# Patient Record
Sex: MALE | Race: WHITE | NOT HISPANIC OR LATINO | Employment: FULL TIME | URBAN - METROPOLITAN AREA
[De-identification: names, ages, dates, MRNs, and addresses within clinical notes are randomized per-mention and may not be internally consistent; named-entity substitution may affect disease eponyms.]

---

## 2019-09-27 ENCOUNTER — TELEPHONE (OUTPATIENT)
Dept: SCHEDULING | Facility: CLINIC | Age: 74
End: 2019-09-27

## 2019-09-27 NOTE — TELEPHONE ENCOUNTER
"Patient needs APPT with Olivia or Arden, not with Dr Velez     Spoke with patient's wife - informed her that Kayce Baker and Arden are the specialists for Aortic Valve   Patient worked up at [facility], needs Aortic Valve - looking for MIS approach.  Cath 2 weeks ago, \"coronaries are clean\".      Please contact patient on Tuesday, (busy with liza Santos)   Best contact # -  Charly Reaves CELL # [phone]    Thanks DENZEL rainey   "

## 2019-09-27 NOTE — TELEPHONE ENCOUNTER
Nicki, spouse requesting Np appt for  Charly with Dr. Velez referred by current cardiologist Dr. Beka Licona at [facility] for Arterial Valve robotic replacement.    Prior hx: Had cardiac cath two weeks ago at [facility] tel: [phone] ( wife states all cardiac hx at [facility])      Nicki did not have insurance cards at time of call, but states when she gets call back she will give information. Pt has both medicare and Blue cross blue shield insurance.    Nicki can be reached at [phone]

## 2019-09-30 NOTE — TELEPHONE ENCOUNTER
Noted. I will call patient tomorrow 10/1 as requesed.    MAW- Do you by chance know if this patient has had an Echo already or not?

## 2019-10-02 ENCOUNTER — TELEPHONE (OUTPATIENT)
Dept: CARDIOTHORACIC SURGERY | Facility: CLINIC | Age: 74
End: 2019-10-02

## 2019-10-02 NOTE — TELEPHONE ENCOUNTER
Signed release required for medical records at [facility] [address]. Email SHONA to sign and return.    Patient has already requested cath and echo disc from [facility] sent via mail to us  [email or web address]    [phone] Medical Records  [phone] Fax  [phone] SHONA Althea

## 2019-10-03 PROBLEM — I35.1 AORTIC REGURGITATION: Status: ACTIVE | Noted: 2019-10-03

## 2019-10-03 PROBLEM — I45.10 RBBB (RIGHT BUNDLE BRANCH BLOCK): Status: ACTIVE | Noted: 2019-10-03

## 2019-10-03 NOTE — TELEPHONE ENCOUNTER
The cardiologist mentioned an echo that was done at [place] during a second opinion for his AI.  I believe the note said it was done Sept 18 2018. And also an echo that was done in July 2019

## 2019-10-15 ENCOUNTER — OFFICE VISIT (OUTPATIENT)
Dept: CARDIOTHORACIC SURGERY | Facility: CLINIC | Age: 74
End: 2019-10-15
Payer: MEDICARE

## 2019-10-15 VITALS
HEART RATE: 91 BPM | SYSTOLIC BLOOD PRESSURE: 138 MMHG | DIASTOLIC BLOOD PRESSURE: 62 MMHG | WEIGHT: 179.2 LBS | TEMPERATURE: 97.7 F | RESPIRATION RATE: 15 BRPM | OXYGEN SATURATION: 95 % | HEIGHT: 71 IN | BODY MASS INDEX: 25.09 KG/M2

## 2019-10-15 DIAGNOSIS — I35.1 AORTIC VALVE INSUFFICIENCY, ETIOLOGY OF CARDIAC VALVE DISEASE UNSPECIFIED: Primary | ICD-10-CM

## 2019-10-15 PROCEDURE — 99214 OFFICE O/P EST MOD 30 MIN: CPT | Performed by: THORACIC SURGERY (CARDIOTHORACIC VASCULAR SURGERY)

## 2019-10-15 RX ORDER — BENAZEPRIL HYDROCHLORIDE 40 MG/1
40 TABLET ORAL DAILY
COMMUNITY
Start: 2019-10-10

## 2019-10-15 RX ORDER — ZOLPIDEM TARTRATE 10 MG/1
10 TABLET ORAL NIGHTLY PRN
COMMUNITY
Start: 2019-08-16

## 2019-10-15 RX ORDER — ASPIRIN 81 MG/1
81 TABLET ORAL DAILY
COMMUNITY

## 2019-10-15 RX ORDER — OMEPRAZOLE 20 MG/1
20 CAPSULE, DELAYED RELEASE ORAL DAILY
COMMUNITY
Start: 2019-10-07

## 2019-10-15 RX ORDER — METOPROLOL SUCCINATE 50 MG/1
50 TABLET, EXTENDED RELEASE ORAL DAILY
COMMUNITY
Start: 2019-09-13

## 2019-10-15 RX ORDER — SIMVASTATIN 20 MG/1
20 TABLET, FILM COATED ORAL DAILY
COMMUNITY
Start: 2017-04-24

## 2019-10-15 ASSESSMENT — ENCOUNTER SYMPTOMS
ARTHRALGIAS: 0
FEVER: 0
SHORTNESS OF BREATH: 0
DIZZINESS: 0
CHEST TIGHTNESS: 0
AGITATION: 0
FATIGUE: 0
LIGHT-HEADEDNESS: 0
CONFUSION: 0

## 2019-10-15 NOTE — LETTER
October 16, 2019     Dariel Licona MD  [address] [facility]  [address] [place] [id number]    Patient: Charly Nazario  YOB: 1945  Date of Visit: 10/15/2019      Dear Dr. Licona:    Thank you for referring Charly Nazario to me for evaluation. Below are my notes for this consultation.    If you have questions, please do not hesitate to call me. I look forward to following your patient along with you.         Sincerely,        Rachid Baker MD        CC: DO Olivia Parada Scott M, MD  10/16/2019  9:45 AM  Signed  I, Rachid Baker MD, personally performed the services described in this documentation as scribed by JARRET Wall in my presence, and it is both accurate and complete.    10/16/2019 9:45 AM      Rachid Baker MD  10/16/2019  9:45 AM  Signed    Cardiac Surgery    Reason for consultation: Aortic insufficiency    HPI  Patient is a 74 y.o. male here for a surgical evaluation of his aortic insufficiency.  He has been followed by Dr. Licona for years for his aortic insufficiency.  He states this has been moderate for the past 12 years.  He has seen Dr. Tineo for a surgical opinion and is here for a second opinion.  He currently states he is completely asymptomatic.   He is very active.  He plays golf multiple times a week as well as works out.  He denies chest pain, MATHEW, SOB, dizziness, fatigue.        Medical History:   Past Medical History:   Diagnosis Date   • Aortic regurgitation 10/3/2019   • RBBB (right bundle branch block) 10/3/2019       Surgical History: History reviewed. No pertinent surgical history.    Allergies: Ibuprofen and Naproxen sodium    Current Outpatient Medications   Medication Sig Dispense Refill   • aspirin 81 mg enteric coated tablet Take 81 mg by mouth 2 times daily.     • benazepril (LOTENSIN) 40 mg tablet Take 40 mg by mouth daily.     • melatonin (MELATIN ORAL) Take 30 mg by mouth nightly as needed.     • metoprolol succinate XL (TOPROL-XL)  50 mg 24 hr tablet Take 50 mg by mouth daily.     • omeprazole (PriLOSEC) 20 mg capsule Take 20 mg by mouth daily.     • simvastatin (ZOCOR) 20 mg tablet Take 20 mg by mouth daily.     • zolpidem (AMBIEN) 10 mg tablet Take 10 mg by mouth nightly as needed.       No current facility-administered medications for this visit.        Social History:   Social History     Socioeconomic History   • Marital status:      Spouse name: None   • Number of children: None   • Years of education: None   • Highest education level: None   Occupational History   • None   Social Needs   • Financial resource strain: None   • Food insecurity:     Worry: None     Inability: None   • Transportation needs:     Medical: None     Non-medical: None   Tobacco Use   • Smoking status: Former Smoker     Last attempt to quit: 1980     Years since quittin.8   • Smokeless tobacco: Never Used   Substance and Sexual Activity   • Alcohol use: Yes     Comment: daily   • Drug use: None   • Sexual activity: None   Lifestyle   • Physical activity:     Days per week: None     Minutes per session: None   • Stress: None   Relationships   • Social connections:     Talks on phone: None     Gets together: None     Attends [employer] service: None     Active member of club or organization: None     Attends meetings of clubs or organizations: None     Relationship status: None   • Intimate partner violence:     Fear of current or ex partner: None     Emotionally abused: None     Physically abused: None     Forced sexual activity: None   Other Topics Concern   • None   Social History Narrative   • None       Family History:   Family History   Problem Relation Age of Onset   • Heart failure Biological Mother    • Cancer Biological Father        Review of Systems  Review of Systems   Constitutional: Negative for fatigue and fever.   Respiratory: Negative for chest tightness and shortness of breath.         No orthopnea   Cardiovascular: Negative for chest  "pain and leg swelling.   Genitourinary:        Rare orthopnea   Musculoskeletal: Negative for arthralgias.   Neurological: Negative for dizziness and light-headedness.   Psychiatric/Behavioral: Negative for agitation, behavioral problems and confusion.   All other systems reviewed and are negative.      Objective     Vital Signs for the last 24 hours:  Visit Vitals  /62 (BP Location: Left upper arm, Patient Position: Sitting)   Pulse 91   Temp 36.5 °C (97.7 °F)   Resp 15   Ht 1.803 m (5' 11\")   Wt 81.3 kg (179 lb 3.2 oz)   SpO2 95%   BMI 24.99 kg/m²       Physical Exam   Constitutional: He is oriented to person, place, and time. He appears well-developed and well-nourished.   HENT:   Head: Normocephalic and atraumatic.   Eyes: Pupils are equal, round, and reactive to light. EOM are normal.   Neck: Normal range of motion. Neck supple.   Cardiovascular: Regular rhythm and S1 normal.   Murmur heard.   Systolic murmur is present with a grade of 2/6.   Diastolic murmur is present with a grade of 2/6.  Pulmonary/Chest: Effort normal and breath sounds normal.   Abdominal: Soft. Bowel sounds are normal.   Musculoskeletal: Normal range of motion. He exhibits no edema.   Neurological: He is alert and oriented to person, place, and time.   Skin: Skin is warm and dry. Capillary refill takes 2 to 3 seconds.   Psychiatric: He has a normal mood and affect. His behavior is normal. Judgment and thought content normal.   Vitals reviewed.          Assessment/Plan   Assessment: Aortic insufficiency  Echoes  And cath reviewed by Dr. Bakre.  Severe asymptomatic aortic insufficiency. HR 90 today.  P 1/2T 250 with a dilated LV.  Ascending aorta measuring 5cm on echo. EF55%.  No recent hospitalization for CHF.   [facility] I.   Patient not managed with daily diuretics.  Patient denies chest pain and syncope. They are on chronic medical therapy. No CAD. Noted.  Plan:  STS risk for surgical intervention is < 1%.  Patient is not frail.  " Recommend a minimally invasive AVR with an ascending aorta replacement.  He will need a CTA chest abdomen and pelvis to further assess ascending aorta. Looks large for his size on echo.  He will also need carotid ultrasound, dental clearance and PAT's prior to surgery.  Patient verbalized understanding and is agreeable to proceed.       Right bundle branch block  Pre existing documented condition.   Plan:  May be more prone to a pacemaker post operatively if a left bundle occurs.  This was discussed with the patient and his wife.        I, JARRET Wall am scribing for and in the presence of JARRET Miller

## 2019-10-15 NOTE — PROGRESS NOTES
Cardiac Surgery    Reason for consultation: Aortic insufficiency    HPI  Patient is a 74 y.o. male here for a surgical evaluation of his aortic insufficiency.  He has been followed by Dr. Licona for years for his aortic insufficiency.  He states this has been moderate for the past 12 years.  He has seen Dr. Tineo for a surgical opinion and is here for a second opinion.  He currently states he is completely asymptomatic.   He is very active.  He plays golf multiple times a week as well as works out.  He denies chest pain, MATHEW, SOB, dizziness, fatigue.        Medical History:   Past Medical History:   Diagnosis Date   • Aortic regurgitation 10/3/2019   • RBBB (right bundle branch block) 10/3/2019       Surgical History: History reviewed. No pertinent surgical history.    Allergies: Ibuprofen and Naproxen sodium    Current Outpatient Medications   Medication Sig Dispense Refill   • aspirin 81 mg enteric coated tablet Take 81 mg by mouth 2 times daily.     • benazepril (LOTENSIN) 40 mg tablet Take 40 mg by mouth daily.     • melatonin (MELATIN ORAL) Take 30 mg by mouth nightly as needed.     • metoprolol succinate XL (TOPROL-XL) 50 mg 24 hr tablet Take 50 mg by mouth daily.     • omeprazole (PriLOSEC) 20 mg capsule Take 20 mg by mouth daily.     • simvastatin (ZOCOR) 20 mg tablet Take 20 mg by mouth daily.     • zolpidem (AMBIEN) 10 mg tablet Take 10 mg by mouth nightly as needed.       No current facility-administered medications for this visit.        Social History:   Social History     Socioeconomic History   • Marital status:      Spouse name: None   • Number of children: None   • Years of education: None   • Highest education level: None   Occupational History   • None   Social Needs   • Financial resource strain: None   • Food insecurity:     Worry: None     Inability: None   • Transportation needs:     Medical: None     Non-medical: None   Tobacco Use   • Smoking status: Former Smoker     Last attempt to  "quit: 1980     Years since quittin.8   • Smokeless tobacco: Never Used   Substance and Sexual Activity   • Alcohol use: Yes     Comment: daily   • Drug use: None   • Sexual activity: None   Lifestyle   • Physical activity:     Days per week: None     Minutes per session: None   • Stress: None   Relationships   • Social connections:     Talks on phone: None     Gets together: None     Attends [employer] service: None     Active member of club or organization: None     Attends meetings of clubs or organizations: None     Relationship status: None   • Intimate partner violence:     Fear of current or ex partner: None     Emotionally abused: None     Physically abused: None     Forced sexual activity: None   Other Topics Concern   • None   Social History Narrative   • None       Family History:   Family History   Problem Relation Age of Onset   • Heart failure Biological Mother    • Cancer Biological Father        Review of Systems  Review of Systems   Constitutional: Negative for fatigue and fever.   Respiratory: Negative for chest tightness and shortness of breath.         No orthopnea   Cardiovascular: Negative for chest pain and leg swelling.   Genitourinary:        Rare orthopnea   Musculoskeletal: Negative for arthralgias.   Neurological: Negative for dizziness and light-headedness.   Psychiatric/Behavioral: Negative for agitation, behavioral problems and confusion.   All other systems reviewed and are negative.      Objective     Vital Signs for the last 24 hours:  Visit Vitals  /62 (BP Location: Left upper arm, Patient Position: Sitting)   Pulse 91   Temp 36.5 °C (97.7 °F)   Resp 15   Ht 1.803 m (5' 11\")   Wt 81.3 kg (179 lb 3.2 oz)   SpO2 95%   BMI 24.99 kg/m²       Physical Exam   Constitutional: He is oriented to person, place, and time. He appears well-developed and well-nourished.   HENT:   Head: Normocephalic and atraumatic.   Eyes: Pupils are equal, round, and reactive to light. EOM are normal. "   Neck: Normal range of motion. Neck supple.   Cardiovascular: Regular rhythm and S1 normal.   Murmur heard.   Systolic murmur is present with a grade of 2/6.   Diastolic murmur is present with a grade of 2/6.  Pulmonary/Chest: Effort normal and breath sounds normal.   Abdominal: Soft. Bowel sounds are normal.   Musculoskeletal: Normal range of motion. He exhibits no edema.   Neurological: He is alert and oriented to person, place, and time.   Skin: Skin is warm and dry. Capillary refill takes 2 to 3 seconds.   Psychiatric: He has a normal mood and affect. His behavior is normal. Judgment and thought content normal.   Vitals reviewed.          Assessment/Plan   Assessment: Aortic insufficiency  Echoes  And cath reviewed by Dr. Baker.  Severe asymptomatic aortic insufficiency. HR 90 today.  P 1/2T 250 with a dilated LV.  Ascending aorta measuring 5cm on echo. EF55%.  No recent hospitalization for CHF.   [facility] I.   Patient not managed with daily diuretics.  Patient denies chest pain and syncope. They are on chronic medical therapy. No CAD. Noted.  Plan:  STS risk for surgical intervention is < 1%.  Patient is not frail.  Recommend a minimally invasive AVR with an ascending aorta replacement.  He will need a CTA chest abdomen and pelvis to further assess ascending aorta. Looks large for his size on echo.  He will also need carotid ultrasound, dental clearance and PAT's prior to surgery.  Patient verbalized understanding and is agreeable to proceed.       Right bundle branch block  Pre existing documented condition.   Plan:  May be more prone to a pacemaker post operatively if a left bundle occurs.  This was discussed with the patient and his wife.        I, JARRET Wall am scribing for and in the presence of JARRET Miller

## 2019-10-16 NOTE — PROGRESS NOTES
I, Rachid Baker MD, personally performed the services described in this documentation as scribed by JARRET Wall in my presence, and it is both accurate and complete.    10/16/2019 9:45 AM

## 2019-12-19 ENCOUNTER — TELEPHONE (OUTPATIENT)
Dept: SCHEDULING | Facility: CLINIC | Age: 74
End: 2019-12-19

## 2019-12-19 NOTE — TELEPHONE ENCOUNTER
Spoke with patient. He has multiple questions in regards to surgical options. He has completed his CTA at [facility]. He would like to have on office visit with Dr Baker to discuss. He will bring his CTA on CD with him. We have scheduled an appointment for January 21. He has a surgery date of January 27.

## 2019-12-19 NOTE — TELEPHONE ENCOUNTER
Pt calling speak with her about a possible surgery as discussed in appointment, no further information please call pt back at [phone] to discuss

## 2020-01-02 NOTE — TELEPHONE ENCOUNTER
Spoke to patient just to clarify that the 1/27 OR date is unable to be kept as Dr. Baker will be out of town.  He was fine with this information and will discuss a new surgery date at his appt. On 1/21/20.

## 2020-01-14 ENCOUNTER — TELEPHONE (OUTPATIENT)
Dept: CARDIOTHORACIC SURGERY | Facility: CLINIC | Age: 75
End: 2020-01-14

## 2020-01-14 PROBLEM — I77.810 ASCENDING AORTA DILATATION (CMS/HCC): Status: ACTIVE | Noted: 2020-01-14

## 2020-01-14 PROBLEM — I10 HTN (HYPERTENSION): Status: ACTIVE | Noted: 2020-01-14

## 2020-01-21 ENCOUNTER — OFFICE VISIT (OUTPATIENT)
Dept: CARDIOTHORACIC SURGERY | Facility: CLINIC | Age: 75
End: 2020-01-21
Payer: MEDICARE

## 2020-01-21 VITALS
BODY MASS INDEX: 25.76 KG/M2 | HEIGHT: 71 IN | HEART RATE: 66 BPM | OXYGEN SATURATION: 97 % | TEMPERATURE: 97.9 F | SYSTOLIC BLOOD PRESSURE: 142 MMHG | WEIGHT: 184 LBS | RESPIRATION RATE: 16 BRPM | DIASTOLIC BLOOD PRESSURE: 62 MMHG

## 2020-01-21 DIAGNOSIS — I77.810 ASCENDING AORTA DILATATION (CMS/HCC): ICD-10-CM

## 2020-01-21 DIAGNOSIS — I35.1 AORTIC VALVE INSUFFICIENCY, ETIOLOGY OF CARDIAC VALVE DISEASE UNSPECIFIED: Primary | ICD-10-CM

## 2020-01-21 PROCEDURE — 99214 OFFICE O/P EST MOD 30 MIN: CPT | Performed by: THORACIC SURGERY (CARDIOTHORACIC VASCULAR SURGERY)

## 2020-01-21 RX ORDER — MUPIROCIN 20 MG/G
OINTMENT TOPICAL
Qty: 22 G | Refills: 0 | Status: SHIPPED | OUTPATIENT
Start: 2020-01-21

## 2020-01-21 ASSESSMENT — ENCOUNTER SYMPTOMS
ARTHRALGIAS: 0
WEAKNESS: 0
AGITATION: 0
DIARRHEA: 0
CONSTIPATION: 0
ACTIVITY CHANGE: 0
MYALGIAS: 0
DIZZINESS: 0
SHORTNESS OF BREATH: 0
RHINORRHEA: 0
CHEST TIGHTNESS: 0
COUGH: 0
CONFUSION: 0
BACK PAIN: 0
WOUND: 0
BRUISES/BLEEDS EASILY: 0
LIGHT-HEADEDNESS: 0
PALPITATIONS: 0
FATIGUE: 0
DIFFICULTY URINATING: 0
FREQUENCY: 0
EYE PAIN: 0

## 2020-01-21 NOTE — PROGRESS NOTES
Cardiac Surgery    Reason for consultation: Aortic Insufficiency    HPI  Patient is a 74 y.o. male who returns for further evaluation of aortic insufficiency. He states this has been moderate for the past 12 years.  He has seen Dr. Tineo for a surgical opinion and scheduled to see Dr Stewart for another surgical opinion. He currently states he is completely asymptomatic.   He continues to be very active.  He plays golf multiple times a week as well as works out.  He denies chest pain, MATHEW, SOB, dizziness, fatigue.    Medical History:   Past Medical History:   Diagnosis Date   • Aortic regurgitation 10/3/2019   • Ascending aorta dilatation (CMS/HCC) 1/14/2020   • HTN (hypertension) 1/14/2020   • RBBB (right bundle branch block) 10/3/2019       Surgical History: History reviewed. No pertinent surgical history.    Allergies: Ibuprofen and Naproxen sodium    Current Outpatient Medications   Medication Sig Dispense Refill   • aspirin 81 mg enteric coated tablet Take 81 mg by mouth daily.       • benazepril (LOTENSIN) 40 mg tablet Take 40 mg by mouth daily.     • melatonin (MELATIN ORAL) Take 30 mg by mouth nightly as needed.     • metoprolol succinate XL (TOPROL-XL) 50 mg 24 hr tablet Take 50 mg by mouth daily.     • omeprazole (PriLOSEC) 20 mg capsule Take 20 mg by mouth daily.     • simvastatin (ZOCOR) 20 mg tablet Take 20 mg by mouth daily.     • zolpidem (AMBIEN) 10 mg tablet Take 10 mg by mouth nightly as needed.       No current facility-administered medications for this visit.        Social History:   Social History     Socioeconomic History   • Marital status:      Spouse name: None   • Number of children: None   • Years of education: None   • Highest education level: None   Occupational History   • None   Social Needs   • Financial resource strain: None   • Food insecurity:     Worry: None     Inability: None   • Transportation needs:     Medical: None     Non-medical: None   Tobacco Use   • Smoking  status: Former Smoker     Last attempt to quit: 1980     Years since quittin.0   • Smokeless tobacco: Never Used   Substance and Sexual Activity   • Alcohol use: Yes     Comment: daily   • Drug use: None   • Sexual activity: None   Lifestyle   • Physical activity:     Days per week: None     Minutes per session: None   • Stress: None   Relationships   • Social connections:     Talks on phone: None     Gets together: None     Attends [employer] service: None     Active member of club or organization: None     Attends meetings of clubs or organizations: None     Relationship status: None   • Intimate partner violence:     Fear of current or ex partner: None     Emotionally abused: None     Physically abused: None     Forced sexual activity: None   Other Topics Concern   • None   Social History Narrative   • None       Family History:   Family History   Problem Relation Age of Onset   • Heart failure Biological Mother    • Cancer Biological Father      Review of Systems   Constitutional: Negative for activity change and fatigue.   HENT: Negative for congestion, postnasal drip and rhinorrhea.    Eyes: Negative for pain and visual disturbance.   Respiratory: Negative for cough, chest tightness and shortness of breath.    Cardiovascular: Negative for chest pain, palpitations and leg swelling.   Gastrointestinal: Negative for constipation and diarrhea.   Genitourinary: Negative for difficulty urinating, frequency and urgency.   Musculoskeletal: Negative for arthralgias, back pain and myalgias.   Skin: Negative for rash and wound.   Neurological: Negative for dizziness, syncope, weakness and light-headedness.   Hematological: Does not bruise/bleed easily.   Psychiatric/Behavioral: Negative for agitation, behavioral problems and confusion.   All other systems reviewed and are negative.    Objective   Vital Signs for the last 24 hours:  Visit Vitals  BP (!) 142/62 (BP Location: Left upper arm, Patient Position: Sitting)  "  Pulse 66   Temp 36.6 °C (97.9 °F) (Oral)   Resp 16   Ht 1.803 m (5' 11\")   Wt 83.5 kg (184 lb)   SpO2 97%   BMI 25.66 kg/m²       Physical Exam   Constitutional: He is oriented to person, place, and time. He appears well-developed and well-nourished. No distress.   Cardiovascular: Normal rate.   Murmur heard.   Systolic murmur is present with a grade of 3/6.  Pulmonary/Chest: Effort normal and breath sounds normal. No respiratory distress. He has no rales.   Abdominal: Soft. Bowel sounds are normal.   Musculoskeletal: Normal range of motion. He exhibits no edema.   Neurological: He is alert and oriented to person, place, and time.   Skin: Skin is warm and dry. Capillary refill takes 2 to 3 seconds.   Psychiatric: He has a normal mood and affect. His behavior is normal. Judgment and thought content normal.   Vitals reviewed.    Assessment/Plan   Assessment: Aortic stenosis  Echo, cath and CTA reviewed by***.  Severe calcific aortic stenosis symptomatic with occasional lightheadedness, MATHEW.  JUDE***, mean gradient***, velocity***, EF***.  Symptoms not managed with daily diuretics.  [facility] II. No chest discomfort. No syncopal or presyncopal episodes.  The patient is tolerating medical therapy.  Nonobstructive CAD.  Patent femoral iliac vessels.  Plan:  STS risk for surgical intervention is***.  Patient is frail and failed frailty exam.  Recommend proceeding with a***TAVR.  Will need to stop *** prior to surgery.  Also needs carotid ultrasound and PATs prior to TAVR.  I have discussed with the patient and family the rationale for procedures as well as possible alternatives.  We discussed potential risks and complications associated with this procedure. The risks include, but are not limited to: bleeding, infection, arrhythmias, myocardial infarction, temi-valvular insufficiency,  stroke, death, renal as well as pulmonary insufficiency and the possibility of blood transfusions,  permanent pacemaker placement intra-aortic " balloon placement,  assist device placement, cardiac wire perforation, and requirement of CPB.  The patient and family understand and agree to proceed.    I, JARRET Wall am scribing for and in the presence of ***      JARRET Bazan

## 2020-01-22 PROBLEM — I35.1 AORTIC VALVE REGURGITATION: Status: ACTIVE | Noted: 2020-01-22

## 2020-01-23 ASSESSMENT — ENCOUNTER SYMPTOMS
RHINORRHEA: 0
LIGHT-HEADEDNESS: 0
DIARRHEA: 0
ACTIVITY CHANGE: 0
CONFUSION: 0
WEAKNESS: 0
SHORTNESS OF BREATH: 0
FATIGUE: 0
FREQUENCY: 0
CONSTIPATION: 0
COUGH: 0
BRUISES/BLEEDS EASILY: 0
WOUND: 0
PALPITATIONS: 0
BACK PAIN: 0
AGITATION: 0
DIZZINESS: 0
CHEST TIGHTNESS: 0
MYALGIAS: 0
DIFFICULTY URINATING: 0
ARTHRALGIAS: 0

## 2020-01-23 NOTE — PROGRESS NOTES
Cardiac Surgery   Reason for consultation: Aortic Insufficiency   HPI   Patient is a 74 y.o. male who returns for further evaluation of aortic insufficiency. He has been followed by his cardiologist, Dr Licona, for years for his aortic insufficiency. He states this has been moderate for the past 12 years. He has seen Dr. John Tineo for a surgical opinion and is scheduled to see Dr Stewart soon for another surgical opinion. He currently states he is completely asymptomatic. He continues to be very active. He plays golf multiple times a week as well as regular work outs. Pertinent negatives include MATHEW, chest pain, palpitations, dizziness, lightheadedness, syncope, edema. or fatigue. His PMH includes RBBB and 1st degree AVB.   Medical History:   Medical History                                            Surgical History:   Surgical History        Allergies: Ibuprofen and Naproxen sodium   Current Medications                                                                                     Social History:   Social History                                                                                                                                                                                                                                                                                                         Family History:         Family History   Problem Relation Age of Onset   • Heart failure Biological Mother    • Cancer Biological Father      Review of Systems   Constitutional: Negative for activity change and fatigue.   HENT: Negative for congestion, postnasal drip and rhinorrhea.   Eyes: Negative for pain and visual disturbance.   Respiratory: Negative for cough, chest tightness and shortness of breath.   Cardiovascular: Negative for chest pain, palpitations and leg swelling.   Gastrointestinal: Negative for constipation and diarrhea.   Genitourinary: Negative for difficulty urinating, frequency and urgency.  "  Musculoskeletal: Negative for arthralgias, back pain and myalgias.   Skin: Negative for rash and wound.   Neurological: Negative for dizziness, syncope, weakness and light-headedness.   Hematological: Does not bruise/bleed easily.   Psychiatric/Behavioral: Negative for agitation, behavioral problems and confusion.   All other systems reviewed and are negative.   Objective   Vital Signs for the last 24 hours:   Visit Vitals   BP (!) 142/62 (BP Location: Left upper arm, Patient Position: Sitting)   Pulse 66   Temp 36.6 °C (97.9 °F) (Oral)   Resp 16   Ht 1.803 m (5' 11\")   Wt 83.5 kg (184 lb)   SpO2 97%   BMI 25.66 kg/m²     Physical Exam   Constitutional: He is oriented to person, place, and time. He appears well-developed and well-nourished. No distress.   Cardiovascular: Normal rate.   Murmur heard.   Systolic murmur is present with a grade of 3/6.   Pulmonary/Chest: Effort normal and breath sounds normal. No respiratory distress. He has no rales.   Abdominal: Soft. Bowel sounds are normal.   Musculoskeletal: Normal range of motion. He exhibits no edema.   Neurological: He is alert and oriented to person, place, and time.   Skin: Skin is warm and dry. Capillary refill takes 2 to 3 seconds.   Psychiatric: He has a normal mood and affect. His behavior is normal. Judgment and thought content normal.   Vitals reviewed.   Assessment/Plan   Assessment: Aortic Insufficiency   Outside echoes and cath reviewed by Dr Baker. Severe aortic insufficiency. He is asymptomatic. P 1/2 T 250 m/s, HR today 66 bpm, dilated LV, aortic root by echo is 4.3 cm, proximal ascending aorta 4.4 cm, EF 55-60%. Patient did not bring the CD of his CTA, which was of the chest only. By report the aortic root measures 4.4 cm, STJ 4.0, ascending aorta 4.5 cm, distal aortic arch 3.0 cm. He is not on a diuretic. [facility] I. No chest discomfort, syncopal, or presyncopal episodes. The patient is tolerating medical therapy. Mild nonobstructive CAD.   Plan: "   STS risk for surgical intervention is 0.7%. Patient is not frail appearing. Recommend proceeding with a minimally invasive AVR with a possible ascending aorta replacement. Will need a CTA of the chest, abdomen, and pelvis for surgical planning. Will need to stop Benazepril 5 days prior to surgery. Will also need carotid ultrasound, PATs, and dental clearance prior to TAVR. I have discussed with the patient and his wife the rationale for procedures as well as possible alternatives. We discussed potential risks and complications associated with this procedure. The risks include, but are not limited to: bleeding, infection, arrhythmias, myocardial infarction, temi-valvular insufficiency, stroke, death, renal as well as pulmonary insufficiency and the possibility of blood transfusions, permanent pacemaker placement intra-aortic balloon placement, assist device placement, cardiac wire perforation, and requirement of CPB. The patient and his wife understand and agree to proceed.   Nakita SHELDON CRNP am scribing for and in the presence of JARRET Gonzalez

## 2020-01-23 NOTE — PROGRESS NOTES
I, Rachid Baker MD, personally performed the services described in this documentation as scribed by JARRET Dorsey in my presence, and it is both accurate and complete.    1/23/2020 1:34 PM

## 2020-01-23 NOTE — PROGRESS NOTES
Cardiac Surgery    Reason for consultation: Aortic Insufficiency   HPI   Patient is a 74 y.o. male who returns for further evaluation of aortic insufficiency. He has been followed by his cardiologist, Dr Licona, for years for his aortic insufficiency. He states this has been moderate for the past 12 years. He has seen Dr. John Tineo for a surgical opinion and is scheduled to see Dr Stewart soon for another surgical opinion. He currently states he is completely asymptomatic. He continues to be very active. He plays golf multiple times a week as well as regular work outs. Pertinent negatives include MATHEW, chest pain, palpitations, dizziness, lightheadedness, syncope, edema. or fatigue. His PMH includes RBBB and 1st degree AVB.     Medical History:   Past Medical History:   Diagnosis Date   • Aortic regurgitation 10/3/2019   • Ascending aorta dilatation (CMS/HCC) 1/14/2020   • HTN (hypertension) 1/14/2020   • RBBB (right bundle branch block) 10/3/2019       Surgical History: History reviewed. No pertinent surgical history.    Allergies: Ibuprofen and Naproxen sodium    Current Outpatient Medications   Medication Sig Dispense Refill   • aspirin 81 mg enteric coated tablet Take 81 mg by mouth daily.       • benazepril (LOTENSIN) 40 mg tablet Take 40 mg by mouth daily.     • melatonin (MELATIN ORAL) Take 30 mg by mouth nightly as needed.     • metoprolol succinate XL (TOPROL-XL) 50 mg 24 hr tablet Take 50 mg by mouth daily.     • omeprazole (PriLOSEC) 20 mg capsule Take 20 mg by mouth daily.     • simvastatin (ZOCOR) 20 mg tablet Take 20 mg by mouth daily.     • zolpidem (AMBIEN) 10 mg tablet Take 10 mg by mouth nightly as needed.     • mupirocin (BACTROBAN) 2 % ointment Nasal application, starting 5 days before surgery Twice daily 22 g 0     No current facility-administered medications for this visit.        Social History:   Social History     Socioeconomic History   • Marital status:      Spouse name: None   •  Number of children: None   • Years of education: None   • Highest education level: None   Occupational History   • None   Social Needs   • Financial resource strain: None   • Food insecurity:     Worry: None     Inability: None   • Transportation needs:     Medical: None     Non-medical: None   Tobacco Use   • Smoking status: Former Smoker     Last attempt to quit: 1980     Years since quittin.0   • Smokeless tobacco: Never Used   Substance and Sexual Activity   • Alcohol use: Yes     Comment: daily   • Drug use: None   • Sexual activity: None   Lifestyle   • Physical activity:     Days per week: None     Minutes per session: None   • Stress: None   Relationships   • Social connections:     Talks on phone: None     Gets together: None     Attends [employer] service: None     Active member of club or organization: None     Attends meetings of clubs or organizations: None     Relationship status: None   • Intimate partner violence:     Fear of current or ex partner: None     Emotionally abused: None     Physically abused: None     Forced sexual activity: None   Other Topics Concern   • None   Social History Narrative   • None       Family History:   Family History   Problem Relation Age of Onset   • Heart failure Biological Mother    • Cancer Biological Father      Review of Systems   Constitutional: Negative for activity change and fatigue.   HENT: Negative for congestion, postnasal drip and rhinorrhea.    Eyes: Negative for visual disturbance.   Respiratory: Negative for cough, chest tightness and shortness of breath.    Cardiovascular: Negative for chest pain, palpitations and leg swelling.   Gastrointestinal: Negative for constipation and diarrhea.   Genitourinary: Negative for difficulty urinating, frequency and urgency.   Musculoskeletal: Negative for arthralgias, back pain and myalgias.   Skin: Negative for rash and wound.   Neurological: Negative for dizziness, syncope, weakness and light-headedness.  "  Hematological: Does not bruise/bleed easily.   Psychiatric/Behavioral: Negative for agitation, behavioral problems and confusion.   All other systems reviewed and are negative.    Objective   Vital Signs for the last 24 hours:  Visit Vitals  BP (!) 142/62 (BP Location: Left upper arm, Patient Position: Sitting)   Pulse 66   Temp 36.6 °C (97.9 °F) (Oral)   Resp 16   Ht 1.803 m (5' 11\")   Wt 83.5 kg (184 lb)   SpO2 97%   BMI 25.66 kg/m²       Physical Exam   Constitutional: He is oriented to person, place, and time. He appears well-developed and well-nourished. No distress.   Cardiovascular:   Murmur heard.   Systolic murmur is present with a grade of 3/6.  Pulmonary/Chest: Effort normal and breath sounds normal. No respiratory distress. He has no rales.   Abdominal: Soft. Bowel sounds are normal.   Musculoskeletal: Normal range of motion. He exhibits no edema.   Neurological: He is alert and oriented to person, place, and time.   Skin: Skin is warm and dry. Capillary refill takes 2 to 3 seconds.   Psychiatric: He has a normal mood and affect. His behavior is normal. Judgment and thought content normal.   Vitals reviewed.    Assessment/Plan   Assessment: Aortic Insufficiency   Outside echoes and cath reviewed by Dr Baker. Severe aortic insufficiency. He is asymptomatic. P 1/2 T 250 m/s, HR today 66 bpm, dilated LV, aortic root by echo is 4.3 cm, proximal ascending aorta 4.4 cm, EF 55-60%. Patient did not bring the CD of his CTA, which was of the chest only. By report the aortic root measures 4.4 cm, STJ 4.0, ascending aorta 4.5 cm, distal aortic arch 3.0 cm. He is not on a diuretic. [facility] I. No chest discomfort, syncopal, or presyncopal episodes. The patient is tolerating medical therapy. Mild nonobstructive CAD.   Plan:   STS risk for surgical intervention is 0.7%. Patient is not frail appearing. Recommend proceeding with a minimally invasive AVR with a possible ascending aorta replacement. Will need a CTA of the " chest, abdomen, and pelvis for surgical planning. Will need to stop Benazepril 5 days prior to surgery. Will also need carotid ultrasound, PATs, and dental clearance prior to SAVR. I have discussed with the patient and his wife the rationale for procedures as well as possible alternatives. We discussed potential risks and complications associated with this procedure. The risks include, but are not limited to: bleeding, infection, arrhythmias, myocardial infarction, temi-valvular insufficiency, stroke, death, renal as well as pulmonary insufficiency and the possibility of blood transfusions, permanent pacemaker placement intra-aortic balloon placement, assist device placement, cardiac wire perforation, and requirement of CPB. The patient and his wife understand and agree to proceed.     INakita CRNP am scribing for and in the presence of JARRET Gonzalez

## 2020-02-12 ENCOUNTER — TELEPHONE (OUTPATIENT)
Dept: CARDIOTHORACIC SURGERY | Facility: CLINIC | Age: 75
End: 2020-02-12

## 2020-02-12 NOTE — TELEPHONE ENCOUNTER
Called and left patient a voicemail letting him know that I scheduled his pre admission testing and imaging appointments.     I did place a letter in the mail with this information for him.

## 2020-03-03 ENCOUNTER — TELEPHONE (OUTPATIENT)
Dept: SCHEDULING | Facility: CLINIC | Age: 75
End: 2020-03-03

## 2021-07-26 ENCOUNTER — APPOINTMENT (RX ONLY)
Dept: URBAN - METROPOLITAN AREA CLINIC 28 | Facility: CLINIC | Age: 76
Setting detail: DERMATOLOGY
End: 2021-07-26

## 2021-07-26 DIAGNOSIS — L82.1 OTHER SEBORRHEIC KERATOSIS: ICD-10-CM

## 2021-07-26 DIAGNOSIS — L91.0 HYPERTROPHIC SCAR: ICD-10-CM

## 2021-07-26 DIAGNOSIS — I78.1 NEVUS, NON-NEOPLASTIC: ICD-10-CM

## 2021-07-26 PROBLEM — D48.5 NEOPLASM OF UNCERTAIN BEHAVIOR OF SKIN: Status: ACTIVE | Noted: 2021-07-26

## 2021-07-26 PROCEDURE — 17110 DESTRUCTION B9 LES UP TO 14: CPT | Mod: 59

## 2021-07-26 PROCEDURE — ? ADDITIONAL NOTES

## 2021-07-26 PROCEDURE — 99202 OFFICE O/P NEW SF 15 MIN: CPT | Mod: 25

## 2021-07-26 PROCEDURE — ? SHAVE REMOVAL

## 2021-07-26 PROCEDURE — 11301 SHAVE SKIN LESION 0.6-1.0 CM: CPT

## 2021-07-26 PROCEDURE — ? BENIGN DESTRUCTION

## 2021-07-26 PROCEDURE — ? COUNSELING

## 2021-07-26 ASSESSMENT — LOCATION DETAILED DESCRIPTION DERM
LOCATION DETAILED: NASAL SUPRATIP
LOCATION DETAILED: STERNUM

## 2021-07-26 ASSESSMENT — LOCATION SIMPLE DESCRIPTION DERM
LOCATION SIMPLE: CHEST
LOCATION SIMPLE: NOSE

## 2021-07-26 ASSESSMENT — LOCATION ZONE DERM
LOCATION ZONE: NOSE
LOCATION ZONE: TRUNK

## 2021-07-26 NOTE — PROCEDURE: SHAVE REMOVAL
Medical Necessity Information: It is in your best interest to select a reason for this procedure from the list below. All of these items fulfill various CMS LCD requirements except the new and changing color options.
Medical Necessity Clause: This procedure was medically necessary because the lesion that was treated was:
Lab: 6
Detail Level: Detailed
Was A Bandage Applied: Yes
Size Of Lesion In Cm (Required): 0.6
X Size Of Lesion In Cm (Optional): 0
Biopsy Method: Dermablade
Anesthesia Type: 1% lidocaine with epinephrine
Hemostasis: Drysol
Wound Care: Petrolatum
Render Path Notes In Note?: No
Consent was obtained from the patient. The risks and benefits to therapy were discussed in detail. Specifically, the risks of infection, scarring, bleeding, prolonged wound healing, incomplete removal, allergy to anesthesia, nerve injury and recurrence were addressed. Prior to the procedure, the treatment site was clearly identified and confirmed by the patient. All components of Universal Protocol/PAUSE Rule completed.
Post-Care Instructions: I reviewed with the patient in detail post-care instructions. Patient is to keep the biopsy site dry overnight, and then apply bacitracin twice daily until healed. Patient may apply hydrogen peroxide soaks to remove any crusting.
Notification Instructions: Patient will be notified of pathology results. However, patient instructed to call the office if not contacted within 2 weeks.
Billing Type: Third-Party Bill

## 2021-07-26 NOTE — PROCEDURE: ADDITIONAL NOTES
Render Risk Assessment In Note?: no
Additional Notes: Treatment Number: 1\\nLesions Injected: 1\\nMedication Injected: 5.0 mg/cc of Kenalog\\nTotal Volume Injected: 1 cccc\\nLot Number: [id number]\\nExpiration Date: 09/2022\\nAdministered by: SHARA
Detail Level: Simple

## 2021-07-26 NOTE — PROCEDURE: BENIGN DESTRUCTION
Render Note In Bullet Format When Appropriate: No
97
Treatment Number (Will Not Render If 0): 0
Medical Necessity Information: It is in your best interest to select a reason for this procedure from the list below. All of these items fulfill various CMS LCD requirements except the new and changing color options.
Detail Level: Detailed
Consent: The patient's consent was obtained including but not limited to risks of crusting, scabbing, blistering, scarring, darker or lighter pigmentary change, recurrence, incomplete removal and infection.
Medical Necessity Clause: This procedure was medically necessary because the lesions that were treated were:
Post-Care Instructions: I reviewed with the patient in detail post-care instructions. Patient is to wear sunprotection, and avoid picking at any of the treated lesions. Pt may apply Vaseline to crusted or scabbing areas.
Anesthesia Volume In Cc: 0.5

## 2021-09-14 ENCOUNTER — APPOINTMENT (RX ONLY)
Dept: URBAN - METROPOLITAN AREA CLINIC 28 | Facility: CLINIC | Age: 76
Setting detail: DERMATOLOGY
End: 2021-09-14

## 2021-09-14 DIAGNOSIS — L57.0 ACTINIC KERATOSIS: ICD-10-CM

## 2021-09-14 DIAGNOSIS — D22 MELANOCYTIC NEVI: ICD-10-CM

## 2021-09-14 DIAGNOSIS — L23.7 ALLERGIC CONTACT DERMATITIS DUE TO PLANTS, EXCEPT FOOD: ICD-10-CM

## 2021-09-14 DIAGNOSIS — L91.0 HYPERTROPHIC SCAR: ICD-10-CM | Status: RESOLVING

## 2021-09-14 PROBLEM — D48.5 NEOPLASM OF UNCERTAIN BEHAVIOR OF SKIN: Status: ACTIVE | Noted: 2021-09-14

## 2021-09-14 PROCEDURE — ? PATHOLOGY DISCUSSION

## 2021-09-14 PROCEDURE — ? PRESCRIPTION MEDICATION MANAGEMENT

## 2021-09-14 PROCEDURE — 11311 SHAVE SKIN LESION 0.6-1.0 CM: CPT

## 2021-09-14 PROCEDURE — ? PHOTO-DOCUMENTATION

## 2021-09-14 PROCEDURE — ? SHAVE REMOVAL

## 2021-09-14 PROCEDURE — 99213 OFFICE O/P EST LOW 20 MIN: CPT | Mod: 25

## 2021-09-14 PROCEDURE — ? PRESCRIPTION

## 2021-09-14 PROCEDURE — ? COUNSELING

## 2021-09-14 RX ORDER — TRIAMCINOLONE ACETONIDE 1 MG/G
1 CREAM TOPICAL BID
Qty: 80 | Refills: 2 | Status: ERX | COMMUNITY
Start: 2021-09-14

## 2021-09-14 RX ORDER — PREDNISONE 10 MG/1
TABLET ORAL
Qty: 21 | Refills: 0 | Status: ERX | COMMUNITY
Start: 2021-09-14

## 2021-09-14 RX ADMIN — TRIAMCINOLONE ACETONIDE 1: 1 CREAM TOPICAL at 00:00

## 2021-09-14 RX ADMIN — PREDNISONE: 10 TABLET ORAL at 00:00

## 2021-09-14 ASSESSMENT — LOCATION SIMPLE DESCRIPTION DERM
LOCATION SIMPLE: LEFT THIGH
LOCATION SIMPLE: LEFT CHEEK
LOCATION SIMPLE: RIGHT THIGH
LOCATION SIMPLE: ABDOMEN
LOCATION SIMPLE: CHEST

## 2021-09-14 ASSESSMENT — LOCATION DETAILED DESCRIPTION DERM
LOCATION DETAILED: XIPHOID
LOCATION DETAILED: LEFT CENTRAL MALAR CHEEK
LOCATION DETAILED: LEFT ANTERIOR PROXIMAL THIGH
LOCATION DETAILED: STERNUM
LOCATION DETAILED: RIGHT ANTERIOR PROXIMAL THIGH

## 2021-09-14 ASSESSMENT — LOCATION ZONE DERM
LOCATION ZONE: FACE
LOCATION ZONE: LEG
LOCATION ZONE: TRUNK

## 2021-09-14 ASSESSMENT — SCAR ASSESSEMENT OVERALL: SCAR ASSESSMENT: 2.5 (RAISED UNIFORM SCAR, ERYTHEMA)

## 2021-09-14 NOTE — PROCEDURE: REASSURANCE
Hide Additional Notes?: No
Detail Level: Detailed
Additional Notes (Optional): ILK injections melted down, declines more injections today
Detail Level: Simple

## 2021-09-14 NOTE — PROCEDURE: PHOTO-DOCUMENTATION
Photo Preface (Leave Blank If You Do Not Want): Photographs were obtained today
Detail Level: Zone
Medical Necessity Information: It is in your best interest to select a reason for this procedure from the list below. All of these items fulfill various CMS LCD requirements except the new and changing color options.
Medical Necessity Clause: This procedure was medically necessary because the lesion that was treated was:
Lab: 6
Detail Level: Detailed
Was A Bandage Applied: Yes
Size Of Lesion In Cm (Required): 0.6
X Size Of Lesion In Cm (Optional): 0
Biopsy Method: Dermablade
Anesthesia Type: 1% lidocaine with epinephrine
Hemostasis: Drysol
Wound Care: Petrolatum
Render Path Notes In Note?: No
Consent was obtained from the patient. The risks and benefits to therapy were discussed in detail. Specifically, the risks of infection, scarring, bleeding, prolonged wound healing, incomplete removal, allergy to anesthesia, nerve injury and recurrence were addressed. Prior to the procedure, the treatment site was clearly identified and confirmed by the patient. All components of Universal Protocol/PAUSE Rule completed.
Post-Care Instructions: I reviewed with the patient in detail post-care instructions. Patient is to keep the biopsy site dry overnight, and then apply bacitracin twice daily until healed. Patient may apply hydrogen peroxide soaks to remove any crusting.
Notification Instructions: Patient will be notified of pathology results. However, patient instructed to call the office if not contacted within 2 weeks.
Billing Type: Third-Party Bill

## 2024-10-25 NOTE — TELEPHONE ENCOUNTER
Cath disc received.    Pt was emailed requesting he obtain [facility] from 8/8/19 and Echo 9/14/18 that was done at [place] for his visit.       Message left with patient for plan for tomorrow. Pt should check in at urgent care which opens at 0900, ok to check in at 0845. This writer will draw the pt's labs and urgent care providers will evaluate pt's lab results.
